# Patient Record
Sex: MALE | Race: WHITE
[De-identification: names, ages, dates, MRNs, and addresses within clinical notes are randomized per-mention and may not be internally consistent; named-entity substitution may affect disease eponyms.]

---

## 2021-10-17 ENCOUNTER — HOSPITAL ENCOUNTER (EMERGENCY)
Dept: HOSPITAL 54 - ER | Age: 31
Discharge: HOME | End: 2021-10-17
Payer: COMMERCIAL

## 2021-10-17 VITALS — BODY MASS INDEX: 42.44 KG/M2 | HEIGHT: 68 IN | WEIGHT: 280 LBS

## 2021-10-17 VITALS — SYSTOLIC BLOOD PRESSURE: 124 MMHG | DIASTOLIC BLOOD PRESSURE: 70 MMHG

## 2021-10-17 DIAGNOSIS — H53.2: Primary | ICD-10-CM

## 2021-10-17 DIAGNOSIS — Z90.89: ICD-10-CM

## 2021-10-17 DIAGNOSIS — G43.909: ICD-10-CM

## 2021-10-17 LAB
BASOPHILS # BLD AUTO: 0.1 K/UL (ref 0–0.2)
BASOPHILS NFR BLD AUTO: 0.8 % (ref 0–2)
BUN SERPL-MCNC: 13 MG/DL (ref 7–18)
CALCIUM SERPL-MCNC: 10 MG/DL (ref 8.5–10.1)
CHLORIDE SERPL-SCNC: 102 MMOL/L (ref 98–107)
CO2 SERPL-SCNC: 26 MMOL/L (ref 21–32)
CREAT SERPL-MCNC: 1.2 MG/DL (ref 0.6–1.3)
EOSINOPHIL NFR BLD AUTO: 1 % (ref 0–6)
GLUCOSE SERPL-MCNC: 98 MG/DL (ref 74–106)
HCT VFR BLD AUTO: 45 % (ref 39–51)
HGB BLD-MCNC: 15.2 G/DL (ref 13.5–17.5)
LYMPHOCYTES NFR BLD AUTO: 1.9 K/UL (ref 0.8–4.8)
LYMPHOCYTES NFR BLD AUTO: 18.5 % (ref 20–44)
MCHC RBC AUTO-ENTMCNC: 34 G/DL (ref 31–36)
MCV RBC AUTO: 90 FL (ref 80–96)
MONOCYTES NFR BLD AUTO: 0.6 K/UL (ref 0.1–1.3)
MONOCYTES NFR BLD AUTO: 6.1 % (ref 2–12)
NEUTROPHILS # BLD AUTO: 7.7 K/UL (ref 1.8–8.9)
NEUTROPHILS NFR BLD AUTO: 73.6 % (ref 43–81)
PLATELET # BLD AUTO: 345 K/UL (ref 150–450)
POTASSIUM SERPL-SCNC: 3.6 MMOL/L (ref 3.5–5.1)
RBC # BLD AUTO: 5 MIL/UL (ref 4.5–6)
SODIUM SERPL-SCNC: 139 MMOL/L (ref 136–145)
WBC NRBC COR # BLD AUTO: 10.5 K/UL (ref 4.3–11)

## 2021-10-17 PROCEDURE — 80048 BASIC METABOLIC PNL TOTAL CA: CPT

## 2021-10-17 PROCEDURE — 70498 CT ANGIOGRAPHY NECK: CPT

## 2021-10-17 PROCEDURE — 36415 COLL VENOUS BLD VENIPUNCTURE: CPT

## 2021-10-17 PROCEDURE — 76536 US EXAM OF HEAD AND NECK: CPT

## 2021-10-17 PROCEDURE — 85025 COMPLETE CBC W/AUTO DIFF WBC: CPT

## 2021-10-17 PROCEDURE — 70496 CT ANGIOGRAPHY HEAD: CPT

## 2021-10-17 PROCEDURE — 70450 CT HEAD/BRAIN W/O DYE: CPT

## 2021-10-17 PROCEDURE — 99285 EMERGENCY DEPT VISIT HI MDM: CPT

## 2021-10-17 NOTE — NUR
patient came in to the er c/o R eye pain and double vision started 10am today, 
5/10 ps. On room air, breathing evenly and unlabored. connected to the monitor 
and pulse ox. kept comfortable, will continue to monitor accordingly.